# Patient Record
Sex: MALE | Race: WHITE | NOT HISPANIC OR LATINO
[De-identification: names, ages, dates, MRNs, and addresses within clinical notes are randomized per-mention and may not be internally consistent; named-entity substitution may affect disease eponyms.]

---

## 2023-10-09 ENCOUNTER — NON-APPOINTMENT (OUTPATIENT)
Age: 43
End: 2023-10-09

## 2023-10-09 PROBLEM — Z00.00 ENCOUNTER FOR PREVENTIVE HEALTH EXAMINATION: Status: ACTIVE | Noted: 2023-10-09

## 2023-10-10 ENCOUNTER — NON-APPOINTMENT (OUTPATIENT)
Age: 43
End: 2023-10-10

## 2023-10-10 ENCOUNTER — APPOINTMENT (OUTPATIENT)
Dept: SPINE | Facility: CLINIC | Age: 43
End: 2023-10-10
Payer: COMMERCIAL

## 2023-10-10 ENCOUNTER — RESULT REVIEW (OUTPATIENT)
Age: 43
End: 2023-10-10

## 2023-10-10 ENCOUNTER — OUTPATIENT (OUTPATIENT)
Dept: OUTPATIENT SERVICES | Facility: HOSPITAL | Age: 43
LOS: 1 days | End: 2023-10-10
Payer: COMMERCIAL

## 2023-10-10 VITALS
WEIGHT: 190 LBS | HEIGHT: 68 IN | SYSTOLIC BLOOD PRESSURE: 135 MMHG | HEART RATE: 86 BPM | BODY MASS INDEX: 28.79 KG/M2 | OXYGEN SATURATION: 98 % | DIASTOLIC BLOOD PRESSURE: 89 MMHG | RESPIRATION RATE: 18 BRPM

## 2023-10-10 PROCEDURE — 99205 OFFICE O/P NEW HI 60 MIN: CPT

## 2023-10-10 PROCEDURE — 72114 X-RAY EXAM L-S SPINE BENDING: CPT

## 2023-10-10 PROCEDURE — 72114 X-RAY EXAM L-S SPINE BENDING: CPT | Mod: 26

## 2023-10-12 ENCOUNTER — NON-APPOINTMENT (OUTPATIENT)
Age: 43
End: 2023-10-12

## 2023-10-16 RX ORDER — TRAMADOL HYDROCHLORIDE 50 MG/1
50 TABLET, COATED ORAL
Qty: 60 | Refills: 0 | Status: ACTIVE | COMMUNITY
Start: 2023-10-16 | End: 1900-01-01

## 2023-10-19 LAB
HCT VFR BLD CALC: 43 %
HGB BLD-MCNC: 14.2 G/DL
MCHC RBC-ENTMCNC: 30.3 PG
MCHC RBC-ENTMCNC: 33 GM/DL
MCV RBC AUTO: 91.9 FL
PLATELET # BLD AUTO: 254 K/UL
RBC # BLD: 4.68 M/UL
RBC # FLD: 12.9 %
WBC # FLD AUTO: 6.62 K/UL

## 2023-10-20 LAB
ESTIMATED AVERAGE GLUCOSE: 103 MG/DL
HBA1C MFR BLD HPLC: 5.2 %

## 2023-10-22 ENCOUNTER — TRANSCRIPTION ENCOUNTER (OUTPATIENT)
Age: 43
End: 2023-10-22

## 2023-10-23 ENCOUNTER — OUTPATIENT (OUTPATIENT)
Dept: OUTPATIENT SERVICES | Facility: HOSPITAL | Age: 43
LOS: 1 days | Discharge: HOME | End: 2023-10-23
Payer: COMMERCIAL

## 2023-10-23 ENCOUNTER — TRANSCRIPTION ENCOUNTER (OUTPATIENT)
Age: 43
End: 2023-10-23

## 2023-10-23 VITALS
TEMPERATURE: 97 F | RESPIRATION RATE: 16 BRPM | DIASTOLIC BLOOD PRESSURE: 75 MMHG | SYSTOLIC BLOOD PRESSURE: 131 MMHG | OXYGEN SATURATION: 96 % | HEART RATE: 81 BPM

## 2023-10-23 VITALS
OXYGEN SATURATION: 99 % | TEMPERATURE: 96 F | WEIGHT: 187.61 LBS | DIASTOLIC BLOOD PRESSURE: 95 MMHG | HEIGHT: 68 IN | HEART RATE: 79 BPM | RESPIRATION RATE: 16 BRPM | SYSTOLIC BLOOD PRESSURE: 154 MMHG

## 2023-10-23 DIAGNOSIS — Z90.89 ACQUIRED ABSENCE OF OTHER ORGANS: Chronic | ICD-10-CM

## 2023-10-23 DIAGNOSIS — Z90.49 ACQUIRED ABSENCE OF OTHER SPECIFIED PARTS OF DIGESTIVE TRACT: Chronic | ICD-10-CM

## 2023-10-23 DIAGNOSIS — K08.409 PARTIAL LOSS OF TEETH, UNSPECIFIED CAUSE, UNSPECIFIED CLASS: Chronic | ICD-10-CM

## 2023-10-23 LAB
BLD GP AB SCN SERPL QL: NEGATIVE — SIGNIFICANT CHANGE UP
BLD GP AB SCN SERPL QL: NEGATIVE — SIGNIFICANT CHANGE UP
RH IG SCN BLD-IMP: POSITIVE — SIGNIFICANT CHANGE UP
RH IG SCN BLD-IMP: POSITIVE — SIGNIFICANT CHANGE UP

## 2023-10-23 PROCEDURE — 86901 BLOOD TYPING SEROLOGIC RH(D): CPT

## 2023-10-23 PROCEDURE — 76000 FLUOROSCOPY <1 HR PHYS/QHP: CPT

## 2023-10-23 PROCEDURE — 86850 RBC ANTIBODY SCREEN: CPT

## 2023-10-23 PROCEDURE — 62380 NDSC DCMPRN 1 NTRSPC LUMBAR: CPT | Mod: 80

## 2023-10-23 PROCEDURE — 86900 BLOOD TYPING SEROLOGIC ABO: CPT

## 2023-10-23 PROCEDURE — 63030 LAMOT DCMPRN NRV RT 1 LMBR: CPT | Mod: RT

## 2023-10-23 PROCEDURE — 62380 NDSC DCMPRN 1 NTRSPC LUMBAR: CPT | Mod: RT

## 2023-10-23 RX ORDER — OXYCODONE HYDROCHLORIDE 5 MG/1
1 TABLET ORAL
Qty: 0 | Refills: 0 | DISCHARGE
Start: 2023-10-23 | End: 2023-10-26

## 2023-10-23 RX ORDER — OXYCODONE HYDROCHLORIDE 5 MG/1
1 TABLET ORAL
Qty: 30 | Refills: 0
Start: 2023-10-23 | End: 2023-10-27

## 2023-10-23 RX ORDER — ACETAMINOPHEN 500 MG
1000 TABLET ORAL ONCE
Refills: 0 | Status: COMPLETED | OUTPATIENT
Start: 2023-10-23 | End: 2023-10-23

## 2023-10-23 RX ORDER — SODIUM CHLORIDE 9 MG/ML
1000 INJECTION, SOLUTION INTRAVENOUS
Refills: 0 | Status: DISCONTINUED | OUTPATIENT
Start: 2023-10-23 | End: 2023-10-23

## 2023-10-23 RX ORDER — LOSARTAN POTASSIUM 100 MG/1
25 TABLET, FILM COATED ORAL DAILY
Refills: 0 | Status: DISCONTINUED | OUTPATIENT
Start: 2023-10-23 | End: 2023-10-23

## 2023-10-23 RX ORDER — POVIDONE-IODINE 5 %
1 AEROSOL (ML) TOPICAL ONCE
Refills: 0 | Status: COMPLETED | OUTPATIENT
Start: 2023-10-23 | End: 2023-10-23

## 2023-10-23 RX ORDER — FINASTERIDE 5 MG/1
5 TABLET, FILM COATED ORAL DAILY
Refills: 0 | Status: DISCONTINUED | OUTPATIENT
Start: 2023-10-23 | End: 2023-10-23

## 2023-10-23 RX ORDER — APREPITANT 80 MG/1
40 CAPSULE ORAL ONCE
Refills: 0 | Status: COMPLETED | OUTPATIENT
Start: 2023-10-23 | End: 2023-10-23

## 2023-10-23 RX ORDER — ACETAMINOPHEN 500 MG
1000 TABLET ORAL EVERY 8 HOURS
Refills: 0 | Status: DISCONTINUED | OUTPATIENT
Start: 2023-10-23 | End: 2023-10-23

## 2023-10-23 RX ORDER — OXYCODONE HYDROCHLORIDE 5 MG/1
10 TABLET ORAL EVERY 6 HOURS
Refills: 0 | Status: DISCONTINUED | OUTPATIENT
Start: 2023-10-23 | End: 2023-10-23

## 2023-10-23 RX ORDER — CHLORHEXIDINE GLUCONATE 213 G/1000ML
1 SOLUTION TOPICAL EVERY 12 HOURS
Refills: 0 | Status: DISCONTINUED | OUTPATIENT
Start: 2023-10-23 | End: 2023-10-23

## 2023-10-23 RX ORDER — ACETAMINOPHEN 500 MG
2 TABLET ORAL
Qty: 0 | Refills: 0 | DISCHARGE
Start: 2023-10-23 | End: 2023-10-26

## 2023-10-23 RX ADMIN — Medication 1 APPLICATION(S): at 07:26

## 2023-10-23 RX ADMIN — APREPITANT 40 MILLIGRAM(S): 80 CAPSULE ORAL at 07:22

## 2023-10-23 RX ADMIN — CHLORHEXIDINE GLUCONATE 1 APPLICATION(S): 213 SOLUTION TOPICAL at 07:27

## 2023-10-23 NOTE — H&P ADULT - NSHPPOADEEPVENOUSTHROMB_GEN_A_CORE
Ear Cerumen Removal    Date/Time: 1/28/2022 10:00 AM  Performed by: Kerri Vasquez DNP, FNP-C  Authorized by: Kerri Vasquez DNP, FNP-C     Consent Done?:  Yes (Verbal)    Local anesthetic:  None  Location details:  Both ears  Procedure type: curette    Cerumen  Removal Results:  Cerumen completely removed  Patient tolerance:  Patient tolerated the procedure well with no immediate complications     Procedure Note:    The patient was brought to the minor procedure room and placed under the operating microscope of the left ear canal which was cleaned of ceruminous debris. Using a combination of suction, curettes and cup forceps the patient's cerumen impaction was removed. The tympanic membrane was evaluated and was unremarkable. The patient tolerated the procedure well. There were no complications.  Procedure Note:    Patient was brought to the minor procedure room and using the operating microscope of the right ear canal which was cleaned of ceruminous debris. There was a significant cerumen impaction.  Using a combination of suction, curettes and cup forceps the patient's cerumen impaction was removed. Tympanic membrane intact. Pt tolerated well. There were no complications.        
no

## 2023-10-23 NOTE — ASU PATIENT PROFILE, ADULT - NSICDXPASTSURGICALHX_GEN_ALL_CORE_FT
PAST SURGICAL HISTORY:  History of appendectomy     History of tonsillectomy     [place] teeth extracted

## 2023-10-23 NOTE — H&P ADULT - ASSESSMENT
This 38 year old male patient presented with low back ache on and off since 2020, now presented with low backache with right lower limb radiation since June 2023. He has difficulty in walking, but can continue to walk despite the pain. He was diagnosed to have Right L4-5 paracentral Prolapsed intervertebral disc. Today he is planned for endoscopic right L4-L5 transforaminal discectomy.

## 2023-10-23 NOTE — DISCHARGE NOTE PROVIDER - NSDCMRMEDTOKEN_GEN_ALL_CORE_FT
finasteride 5 mg oral tablet: 1 tab(s) orally once a day  irbesartan 75 mg oral tablet: 1 tab(s) orally once a day  oxyCODONE 10 mg oral tablet: 1 tab(s) orally every 6 hours as needed for Severe Pain (7 - 10)  Tylenol 500 mg oral tablet: 2 tab(s) orally every 8 hours do not take more than 1000mg per dose

## 2023-10-23 NOTE — PRE-ANESTHESIA EVALUATION ADULT - NSANTHADDINFOFT_GEN_ALL_CORE
General anesthesia, regional nerve block discussed with patient and family.  All questions answered.  Safety emphasized.

## 2023-10-23 NOTE — ASU DISCHARGE PLAN (ADULT/PEDIATRIC) - COMMENTS
Please follow-up in 2 weeks from date of surgery, call the office to confirm your appointment and time.

## 2023-10-23 NOTE — ASU PATIENT PROFILE, ADULT - FALL HARM RISK - UNIVERSAL INTERVENTIONS
Bed in lowest position, wheels locked, appropriate side rails in place/Call bell, personal items and telephone in reach/Instruct patient to call for assistance before getting out of bed or chair/Non-slip footwear when patient is out of bed/[place] to call system/Physically safe environment - no spills, clutter or unnecessary equipment/Purposeful Proactive Rounding/Room/bathroom lighting operational, light cord in reach

## 2023-10-23 NOTE — H&P ADULT - NSICDXPASTSURGICALHX_GEN_ALL_CORE_FT
PAST SURGICAL HISTORY:  History of appendectomy     History of tonsillectomy     [place] teeth extracted

## 2023-10-23 NOTE — H&P ADULT - NSHPPHYSICALEXAM_GEN_ALL_CORE
T(C): 35.8 (10-23-23 @ 07:38), Max: 35.8 (10-23-23 @ 07:12)  HR: 79 (10-23-23 @ 07:38) (79 - 79)  BP: 154/95 (10-23-23 @ 07:38) (154/95 - 154/95)  RR: 16 (10-23-23 @ 07:38) (16 - 16)  SpO2: 99% (10-23-23 @ 07:38) (99% - 99%)    CONSTITUTIONAL: Well groomed, no apparent distress  EYES: PERRLA and symmetric, EOMI, No conjunctival or scleral injection, non-icteric  ENMT: Oral mucosa with moist membranes. Normal dentition; no pharyngeal injection or exudates             NECK: Supple, symmetric and without tracheal deviation   RESP: No respiratory distress, no use of accessory muscles; CTA b/l, no WRR  CV: RRR, +S1S2, no MRG; no JVD; no peripheral edema  GI: Soft, NT, ND, no rebound, no guarding; no palpable masses; no hepatosplenomegaly; no hernia palpated  LYMPH: No cervical LAD or tenderness; no axillary LAD or tenderness; no inguinal LAD or tenderness  MSK: Normal gait; No digital clubbing or cyanosis; examination of the (head/neck/spine/ribs/pelvis, RUE, LUE, RLE, LLE) without misalignment,            Normal ROM without pain, no spinal tenderness, normal muscle strength/tone  SKIN: No rashes or ulcers noted; no subcutaneous nodules or induration palpable  NEURO: CN II-XII intact; Right EHL 4+/5, rest all 5/5,  sensation intact in upper and lower extremities b/l to light touch   PSYCH: Appropriate insight/judgment; A+O x 3, mood and affect appropriate, recent/remote memory intact

## 2023-10-23 NOTE — ASU DISCHARGE PLAN (ADULT/PEDIATRIC) - NS MD DC FALL RISK RISK
For information on Fall & Injury Prevention, visit: [email or web address] OR  [email or web address] OR  https://www.cdc.gov/steadi/patient.html

## 2023-10-23 NOTE — ASU DISCHARGE PLAN (ADULT/PEDIATRIC) - CARE PROVIDER_API CALL
Errol Kolb [facility]  Neurosurgery  [address] [address]  Phone: [phone]  Fax: [phone]  Follow Up Time:

## 2023-10-23 NOTE — DISCHARGE NOTE PROVIDER - CARE PROVIDER_API CALL
Errol Kolb [facility]  Neurosurgery  [address] [address]  Phone: [phone]  Fax: [phone]  Follow Up Time: 2 weeks

## 2023-10-23 NOTE — H&P ADULT - HISTORY OF PRESENT ILLNESS
This This 38 year old male patient presented with low back ache on and off since 2020, now presented with low backache with right lower limb radiation since June 2023. He has difficulty in walking, but can continue to walk despite the pain.

## 2023-11-01 PROBLEM — I10 ESSENTIAL (PRIMARY) HYPERTENSION: Chronic | Status: ACTIVE | Noted: 2023-10-20

## 2023-11-07 ENCOUNTER — APPOINTMENT (OUTPATIENT)
Dept: SPINE | Facility: CLINIC | Age: 43
End: 2023-11-07
Payer: COMMERCIAL

## 2023-11-07 DIAGNOSIS — Z86.79 PERSONAL HISTORY OF OTHER DISEASES OF THE CIRCULATORY SYSTEM: ICD-10-CM

## 2023-11-07 DIAGNOSIS — M51.36 OTHER INTERVERTEBRAL DISC DEGENERATION, LUMBAR REGION: ICD-10-CM

## 2023-11-07 PROCEDURE — 99024 POSTOP FOLLOW-UP VISIT: CPT

## 2023-11-07 RX ORDER — METHYLPREDNISOLONE 4 MG/1
4 TABLET ORAL
Qty: 1 | Refills: 0 | Status: ACTIVE | COMMUNITY
Start: 2023-11-07 | End: 1900-01-01

## 2023-12-07 ENCOUNTER — APPOINTMENT (OUTPATIENT)
Dept: SPINE | Facility: CLINIC | Age: 43
End: 2023-12-07
Payer: COMMERCIAL

## 2023-12-07 VITALS
TEMPERATURE: 98.3 F | OXYGEN SATURATION: 97 % | DIASTOLIC BLOOD PRESSURE: 63 MMHG | SYSTOLIC BLOOD PRESSURE: 147 MMHG | HEART RATE: 83 BPM | RESPIRATION RATE: 16 BRPM

## 2023-12-07 PROCEDURE — 99024 POSTOP FOLLOW-UP VISIT: CPT

## 2024-01-18 ENCOUNTER — APPOINTMENT (OUTPATIENT)
Dept: SPINE | Facility: CLINIC | Age: 44
End: 2024-01-18
Payer: COMMERCIAL

## 2024-01-18 VITALS
RESPIRATION RATE: 18 BRPM | WEIGHT: 187 LBS | OXYGEN SATURATION: 98 % | SYSTOLIC BLOOD PRESSURE: 141 MMHG | DIASTOLIC BLOOD PRESSURE: 89 MMHG | HEART RATE: 84 BPM | HEIGHT: 68 IN | BODY MASS INDEX: 28.34 KG/M2 | TEMPERATURE: 97.7 F

## 2024-01-18 PROCEDURE — 99024 POSTOP FOLLOW-UP VISIT: CPT

## 2024-01-19 NOTE — HISTORY OF PRESENT ILLNESS
[FreeTextEntry1] : 44 yo M with severe back pain to RLE for a year without injury. Images showed L4-5 large disc herniation. He underwent elective L4-5 microdiskectomy on 10/23/23. Post op hospital stay was uneventful and he was discharged to home.  11/7/23 he reports pain has been slowly improving and noticed intermittent leg shooting pain from changing position otherwise denies fever/chills, cp, sob, dizziness, n/v, BB dysfunction, redness/swelling/discharge from surgical incision. Pain has been controlled with NSAIDs.  12/7/23 Patient complains of pain when walking, RLE pain and occasional numbness and tingling in the right foot. Patient states he is taking 2 aleve daily for pain.  1/18/24 Today patient complains of continued pain. MRI L reviewed.

## 2024-01-19 NOTE — RESULTS/DATA
[FreeTextEntry1] : EXAM:  MRI LUMBAR SPINE WITHOUT CONTRAST 1/10/24 HISTORY: Right leg pain since June 2023.  History of a microdiscectomy. TECHNIQUE: Multiplanar, multi-sequential MRI of the lumbar spine was obtained on a 1.5T scanner using a standard protocol. COMPARISON:  MRI 10/5/2023. FINDINGS: The vertebral bodies are of normal height, alignment, and bone marrow signal intensity. The spinal cord termination is normal. The conus medullaris lies in a normal location posterior to the L1 vertebral body. Evaluation of the individual disc levels demonstrates: L1-L2: There is no disc herniation, spinal canal, or neuroforaminal stenosis. The facet joints are normal. L2-L3: There is no disc herniation, spinal canal, or neuroforaminal stenosis. The facet joints are normal. L3-L4: There is no disc herniation, spinal canal, or neuroforaminal stenosis. The facet joints are normal. L4-L5: Moderate intervertebral disc degeneration with disc bulge. There is a superimposed right subarticular disc protrusion. The size of this disc protrusion is not significantly changed compared to the prior exam. There is severe right lateral recess stenosis with impingement of the descending right L5 nerve root. Mild central spinal canal stenosis. Moderate right neuroforaminal stenosis, slightly progressed. Mild left neuroforaminal stenosis. L5-S1: Moderate intervertebral disc degeneration with disc bulge. Mild bilateral facet arthropathy. Disc bulge is slightly asymmetric to the left. There is abutment of the descending left S1 nerve root. No central spinal canal stenosis. No neuroforaminal stenosis. The visualized portions of the retroperitoneum and paraspinal muscles are within normal limits. IMPRESSION:  MRI of the lumbar spine demonstrates: 1.  Disc bulge with right subarticular disc protrusion at L4-5, results in severe right lateral recess stenosis and impingement of the descending right L5 nerve root. Moderate right neuroforaminal stenosis at this level slightly progressed compared to the prior exam. 2.  Disc bulge asymmetric to the left L5-S1 results in abutment of the descending left S1 nerve root, not significantly changed.

## 2024-01-30 RX ORDER — GABAPENTIN 300 MG/1
300 CAPSULE ORAL 3 TIMES DAILY
Qty: 90 | Refills: 3 | Status: ACTIVE | COMMUNITY
Start: 2024-01-29 | End: 1900-01-01

## 2024-02-27 ENCOUNTER — APPOINTMENT (OUTPATIENT)
Dept: SPINE | Facility: CLINIC | Age: 44
End: 2024-02-27
Payer: COMMERCIAL

## 2024-02-27 PROCEDURE — 99214 OFFICE O/P EST MOD 30 MIN: CPT

## 2024-02-27 NOTE — HISTORY OF PRESENT ILLNESS
[FreeTextEntry1] : 44 yo M with severe back pain to RLE for a year without injury. Images showed L4-5 large disc herniation. He underwent elective L4-5 microdiskectomy on 10/23/23. Post op hospital stay was uneventful and he was discharged to home.  11/7/23 he reports pain has been slowly improving and noticed intermittent leg shooting pain from changing position otherwise denies fever/chills, cp, sob, dizziness, n/v, BB dysfunction, redness/swelling/discharge from surgical incision. Pain has been controlled with NSAIDs.  12/7/23 Patient complains of pain when walking, RLE pain and occasional numbness and tingling in the right foot. Patient states he is taking 2 aleve daily for pain.  1/18/24 Patient complains of continued pain. MRI L reviewed.   Today 02/28/24

## 2024-02-28 ENCOUNTER — APPOINTMENT (OUTPATIENT)
Dept: SPINE | Facility: CLINIC | Age: 44
End: 2024-02-28
Payer: COMMERCIAL

## 2024-02-28 DIAGNOSIS — M51.26 OTHER INTERVERTEBRAL DISC DISPLACEMENT, LUMBAR REGION: ICD-10-CM

## 2024-02-28 PROCEDURE — 99443: CPT

## 2024-02-28 NOTE — REASON FOR VISIT
[New Patient Visit] : a new patient visit [Home] : at home, [unfilled] , at the time of the visit. [Medical Office: ([facility])___] : at the medical office located in  [Verbal consent obtained from patient] : the patient, [unfilled]

## 2024-02-29 NOTE — ASSESSMENT
[FreeTextEntry1] : Plan:  L4-L5 microdisckectomy. Risks and benefits discussed with patient verbalizing understanding. Pre-op clearance emailed to the patient.

## 2024-02-29 NOTE — HISTORY OF PRESENT ILLNESS
[FreeTextEntry1] : 42 yo M with severe back pain to RLE for a year without injury. Images showed L4-5 large disc herniation. He underwent elective L4-5 microdiskectomy on 10/23/23. Post op hospital stay was uneventful and he was discharged to home.  11/7/23 he reports pain has been slowly improving and noticed intermittent leg shooting pain from changing position otherwise denies fever/chills, cp, sob, dizziness, n/v, BB dysfunction, redness/swelling/discharge from surgical incision. Pain has been controlled with NSAIDs.  12/7/23 Patient complains of pain when walking, RLE pain and occasional numbness and tingling in the right foot. Patient states he is taking 2 aleve daily for pain.  1/18/24 Patient complains of continued pain. MRI L reviewed.   Today 02/28/24 patient reporting RLE pain with numbness and tingling to the right foot. Patient has an appointment to discuss surgery.

## 2024-02-29 NOTE — ADDENDUM
[FreeTextEntry1] : The patient is a 43-year-old gentleman with a history of a prior L4-5 disc herniation that was treated by a microdiscectomy in October 2023.  The patient reported some relief following the surgery but continues to have significant right lower extremity pain that is radiating into the L5 distribution.  He has been trying physical therapy and oral medications as well as lifestyle modification with no significant relief in his symptoms.  A new MRI scan of his lumbar spine shows a fragment of disc material within the lateral recess at L4-5 causing significant compression of the traversing L5 nerve root.  Given the patient's symptoms and imaging findings, I feel that he would benefit from procedure to decompress the L5 nerve root on the right side via a microdiscectomy.  I discussed with him the risks, benefits, alternatives and expected outcomes of performing an L4-5 hemilaminotomy, medial facetectomy, foraminotomies and microdiscectomy.  The patient understood, all of his questions were answered to his satisfaction and he would like to proceed with surgical planning.  He was given instructions regarding medical pretesting as well as scheduling for the operation.

## 2024-03-07 ENCOUNTER — APPOINTMENT (OUTPATIENT)
Dept: INTERNAL MEDICINE | Facility: CLINIC | Age: 44
End: 2024-03-07
Payer: COMMERCIAL

## 2024-03-07 ENCOUNTER — NON-APPOINTMENT (OUTPATIENT)
Age: 44
End: 2024-03-07

## 2024-03-07 VITALS
WEIGHT: 190 LBS | DIASTOLIC BLOOD PRESSURE: 78 MMHG | RESPIRATION RATE: 16 BRPM | SYSTOLIC BLOOD PRESSURE: 118 MMHG | TEMPERATURE: 97.7 F | BODY MASS INDEX: 28.79 KG/M2 | HEIGHT: 68 IN | HEART RATE: 86 BPM | OXYGEN SATURATION: 98 %

## 2024-03-07 DIAGNOSIS — Z01.818 ENCOUNTER FOR OTHER PREPROCEDURAL EXAMINATION: ICD-10-CM

## 2024-03-07 PROCEDURE — 99214 OFFICE O/P EST MOD 30 MIN: CPT | Mod: 25

## 2024-03-07 PROCEDURE — 93000 ELECTROCARDIOGRAM COMPLETE: CPT

## 2024-03-07 PROCEDURE — 36415 COLL VENOUS BLD VENIPUNCTURE: CPT

## 2024-03-07 NOTE — PHYSICAL EXAM
[No Acute Distress] : no acute distress [Well Nourished] : well nourished [Well Developed] : well developed [Well-Appearing] : well-appearing [Normal Sclera/Conjunctiva] : normal sclera/conjunctiva [PERRL] : pupils equal round and reactive to light [EOMI] : extraocular movements intact [Normal Outer Ear/Nose] : the outer ears and nose were normal in appearance [Normal Oropharynx] : the oropharynx was normal [No Lymphadenopathy] : no lymphadenopathy [No JVD] : no jugular venous distention [Supple] : supple [Thyroid Normal, No Nodules] : the thyroid was normal and there were no nodules present [No Respiratory Distress] : no respiratory distress  [No Accessory Muscle Use] : no accessory muscle use [Clear to Auscultation] : lungs were clear to auscultation bilaterally [Normal Rate] : normal rate  [Regular Rhythm] : with a regular rhythm [Normal S1, S2] : normal S1 and S2 [No Murmur] : no murmur heard [No Carotid Bruits] : no carotid bruits [No Varicosities] : no varicosities [No Abdominal Bruit] : a ~M bruit was not heard ~T in the abdomen [Pedal Pulses Present] : the pedal pulses are present [No Edema] : there was no peripheral edema [No Palpable Aorta] : no palpable aorta [No Extremity Clubbing/Cyanosis] : no extremity clubbing/cyanosis [Soft] : abdomen soft [Non-distended] : non-distended [Non Tender] : non-tender [No Masses] : no abdominal mass palpated [No HSM] : no HSM [Normal Bowel Sounds] : normal bowel sounds [Normal Posterior Cervical Nodes] : no posterior cervical lymphadenopathy [Normal Anterior Cervical Nodes] : no anterior cervical lymphadenopathy [No CVA Tenderness] : no CVA  tenderness [No Spinal Tenderness] : no spinal tenderness [No Joint Swelling] : no joint swelling [No Rash] : no rash [Grossly Normal Strength/Tone] : grossly normal strength/tone [No Focal Deficits] : no focal deficits [Coordination Grossly Intact] : coordination grossly intact [Normal Gait] : normal gait [Normal Affect] : the affect was normal [Deep Tendon Reflexes (DTR)] : deep tendon reflexes were 2+ and symmetric [Normal Insight/Judgement] : insight and judgment were intact

## 2024-03-08 LAB
ALBUMIN SERPL ELPH-MCNC: 4.6 G/DL
ALP BLD-CCNC: 56 U/L
ALT SERPL-CCNC: 23 U/L
ANION GAP SERPL CALC-SCNC: 14 MMOL/L
APTT BLD: 29.1 SEC
AST SERPL-CCNC: 17 U/L
BASOPHILS # BLD AUTO: 0.04 K/UL
BASOPHILS NFR BLD AUTO: 0.6 %
BILIRUB SERPL-MCNC: 0.6 MG/DL
BUN SERPL-MCNC: 20 MG/DL
CALCIUM SERPL-MCNC: 9.4 MG/DL
CHLORIDE SERPL-SCNC: 100 MMOL/L
CO2 SERPL-SCNC: 26 MMOL/L
CREAT SERPL-MCNC: 0.82 MG/DL
EGFR: 111 ML/MIN/1.73M2
EOSINOPHIL # BLD AUTO: 0.08 K/UL
EOSINOPHIL NFR BLD AUTO: 1.1 %
ESTIMATED AVERAGE GLUCOSE: 111 MG/DL
GLUCOSE SERPL-MCNC: 103 MG/DL
HBA1C MFR BLD HPLC: 5.5 %
HCT VFR BLD CALC: 40.5 %
HGB BLD-MCNC: 14 G/DL
IMM GRANULOCYTES NFR BLD AUTO: 0.3 %
INR PPP: 0.98 RATIO
LYMPHOCYTES # BLD AUTO: 1.67 K/UL
LYMPHOCYTES NFR BLD AUTO: 23.5 %
MAN DIFF?: NORMAL
MCHC RBC-ENTMCNC: 29.9 PG
MCHC RBC-ENTMCNC: 34.6 GM/DL
MCV RBC AUTO: 86.5 FL
MONOCYTES # BLD AUTO: 0.57 K/UL
MONOCYTES NFR BLD AUTO: 8 %
NEUTROPHILS # BLD AUTO: 4.73 K/UL
NEUTROPHILS NFR BLD AUTO: 66.5 %
PLATELET # BLD AUTO: 251 K/UL
POTASSIUM SERPL-SCNC: 4.4 MMOL/L
PROT SERPL-MCNC: 6.9 G/DL
PT BLD: 11.1 SEC
RBC # BLD: 4.68 M/UL
RBC # FLD: 12.9 %
SODIUM SERPL-SCNC: 140 MMOL/L
WBC # FLD AUTO: 7.11 K/UL

## 2024-03-08 NOTE — ASSESSMENT
[Patient Optimized for Surgery] : Patient optimized for surgery [No Further Testing Recommended] : no further testing recommended [Continue medications as is] : Continue current medications [As per surgery] : as per surgery [FreeTextEntry4] : Patient is low risk for low risk procedure.  He was cleared for a similar procedure in October 2023 and no major health updates have occurred since then.  BP well controlled on irbesartan and he takes finasteride 1mg for alopecia.  RCRI class 1, Evans score 0% EKG was NSR.  Bloodwork was within normal limits.

## 2024-03-08 NOTE — HISTORY OF PRESENT ILLNESS
[No Pertinent Cardiac History] : no history of aortic stenosis, atrial fibrillation, coronary artery disease, recent myocardial infarction, or implantable device/pacemaker [No Pertinent Pulmonary History] : no history of asthma, COPD, sleep apnea, or smoking [No Adverse Anesthesia Reaction] : no adverse anesthesia reaction in self or family member [(Patient denies any chest pain, claudication, dyspnea on exertion, orthopnea, palpitations or syncope)] : Patient denies any chest pain, claudication, dyspnea on exertion, orthopnea, palpitations or syncope [Excellent (>10 METs)] : Excellent (>10 METs) [Chronic Anticoagulation] : no chronic anticoagulation [Chronic Kidney Disease] : no chronic kidney disease [FreeTextEntry1] : microdiscectomy [FreeTextEntry3] : Dr. Christiano Fox [Diabetes] : no diabetes [FreeTextEntry4] : 43M with PMHx alopecia, HTN and chronic low back pain who presents for preoperative clearance for microdiscectomy with Dr. Fox. He had a lumbar spinal surgery in October 2023, however he continues to have severe low back pain and Dr. Fox determined patient would be candidate for the repeat spinal surgery.  Current medications: irbesartan 75mg, finasteride 1mg.

## 2024-03-15 VITALS
WEIGHT: 185.19 LBS | SYSTOLIC BLOOD PRESSURE: 133 MMHG | DIASTOLIC BLOOD PRESSURE: 88 MMHG | HEIGHT: 68 IN | HEART RATE: 79 BPM | OXYGEN SATURATION: 99 % | TEMPERATURE: 98 F | RESPIRATION RATE: 16 BRPM

## 2024-03-15 NOTE — ASU PATIENT PROFILE, ADULT - NSICDXPASTMEDICALHX_GEN_ALL_CORE_FT
PAST MEDICAL HISTORY:  HTN (hypertension)      PAST MEDICAL HISTORY:  HTN (hypertension)     Lumbosacral radiculopathy

## 2024-03-15 NOTE — ASU PATIENT PROFILE, ADULT - NSICDXPASTSURGICALHX_GEN_ALL_CORE_FT
PAST SURGICAL HISTORY:  History of appendectomy     History of tonsillectomy     [place] teeth extracted      PAST SURGICAL HISTORY:  History of appendectomy     History of microdiscectomy lumbar,  2023    History of tonsillectomy     [place] teeth extracted

## 2024-03-17 ENCOUNTER — TRANSCRIPTION ENCOUNTER (OUTPATIENT)
Age: 44
End: 2024-03-17

## 2024-03-17 RX ORDER — POVIDONE-IODINE 5 %
1 AEROSOL (ML) TOPICAL ONCE
Refills: 0 | Status: COMPLETED | OUTPATIENT
Start: 2024-03-18 | End: 2024-03-18

## 2024-03-18 ENCOUNTER — OUTPATIENT (OUTPATIENT)
Dept: OUTPATIENT SERVICES | Facility: HOSPITAL | Age: 44
LOS: 1 days | Discharge: HOME | End: 2024-03-18
Payer: COMMERCIAL

## 2024-03-18 ENCOUNTER — TRANSCRIPTION ENCOUNTER (OUTPATIENT)
Age: 44
End: 2024-03-18

## 2024-03-18 ENCOUNTER — APPOINTMENT (OUTPATIENT)
Dept: SPINE | Facility: HOSPITAL | Age: 44
End: 2024-03-18

## 2024-03-18 VITALS
RESPIRATION RATE: 18 BRPM | HEART RATE: 87 BPM | SYSTOLIC BLOOD PRESSURE: 117 MMHG | DIASTOLIC BLOOD PRESSURE: 63 MMHG | OXYGEN SATURATION: 95 %

## 2024-03-18 DIAGNOSIS — Z90.89 ACQUIRED ABSENCE OF OTHER ORGANS: Chronic | ICD-10-CM

## 2024-03-18 DIAGNOSIS — Z98.890 OTHER SPECIFIED POSTPROCEDURAL STATES: Chronic | ICD-10-CM

## 2024-03-18 DIAGNOSIS — Z90.49 ACQUIRED ABSENCE OF OTHER SPECIFIED PARTS OF DIGESTIVE TRACT: Chronic | ICD-10-CM

## 2024-03-18 DIAGNOSIS — K08.409 PARTIAL LOSS OF TEETH, UNSPECIFIED CAUSE, UNSPECIFIED CLASS: Chronic | ICD-10-CM

## 2024-03-18 PROBLEM — M54.17 RADICULOPATHY, LUMBOSACRAL REGION: Chronic | Status: ACTIVE | Noted: 2024-03-15

## 2024-03-18 LAB
BLD GP AB SCN SERPL QL: NEGATIVE — SIGNIFICANT CHANGE UP
RH IG SCN BLD-IMP: POSITIVE — SIGNIFICANT CHANGE UP

## 2024-03-18 PROCEDURE — C1889: CPT

## 2024-03-18 PROCEDURE — 63030 LAMOT DCMPRN NRV RT 1 LMBR: CPT | Mod: RT

## 2024-03-18 PROCEDURE — 86901 BLOOD TYPING SEROLOGIC RH(D): CPT

## 2024-03-18 PROCEDURE — 86900 BLOOD TYPING SEROLOGIC ABO: CPT

## 2024-03-18 PROCEDURE — 76380 CAT SCAN FOLLOW-UP STUDY: CPT

## 2024-03-18 PROCEDURE — 86850 RBC ANTIBODY SCREEN: CPT

## 2024-03-18 DEVICE — SURGIFOAM PAD 8CM X 12.5CM X 10MM (100): Type: IMPLANTABLE DEVICE | Status: FUNCTIONAL

## 2024-03-18 DEVICE — FLOSEAL NT 5ML: Type: IMPLANTABLE DEVICE | Status: FUNCTIONAL

## 2024-03-18 RX ORDER — METHOCARBAMOL 500 MG/1
1 TABLET, FILM COATED ORAL
Qty: 30 | Refills: 0
Start: 2024-03-18

## 2024-03-18 RX ORDER — SENNA PLUS 8.6 MG/1
2 TABLET ORAL
Qty: 0 | Refills: 0 | DISCHARGE
Start: 2024-03-18

## 2024-03-18 RX ORDER — IBUPROFEN 200 MG
1 TABLET ORAL
Qty: 45 | Refills: 0
Start: 2024-03-18

## 2024-03-18 RX ORDER — GABAPENTIN 400 MG/1
1 CAPSULE ORAL
Refills: 0 | DISCHARGE

## 2024-03-18 RX ORDER — APREPITANT 80 MG/1
40 CAPSULE ORAL ONCE
Refills: 0 | Status: COMPLETED | OUTPATIENT
Start: 2024-03-18 | End: 2024-03-18

## 2024-03-18 RX ORDER — SENNA PLUS 8.6 MG/1
2 TABLET ORAL AT BEDTIME
Refills: 0 | Status: DISCONTINUED | OUTPATIENT
Start: 2024-03-18 | End: 2024-03-18

## 2024-03-18 RX ORDER — NALOXONE HYDROCHLORIDE 4 MG/.1ML
4 SPRAY NASAL
Qty: 1 | Refills: 0
Start: 2024-03-18

## 2024-03-18 RX ORDER — ACETAMINOPHEN 500 MG
1000 TABLET ORAL ONCE
Refills: 0 | Status: COMPLETED | OUTPATIENT
Start: 2024-03-18 | End: 2024-03-18

## 2024-03-18 RX ORDER — ACETAMINOPHEN 500 MG
1000 TABLET ORAL EVERY 8 HOURS
Refills: 0 | Status: DISCONTINUED | OUTPATIENT
Start: 2024-03-18 | End: 2024-03-18

## 2024-03-18 RX ORDER — GABAPENTIN 400 MG/1
300 CAPSULE ORAL THREE TIMES A DAY
Refills: 0 | Status: DISCONTINUED | OUTPATIENT
Start: 2024-03-18 | End: 2024-03-18

## 2024-03-18 RX ORDER — HYDROMORPHONE HYDROCHLORIDE 2 MG/ML
0.25 INJECTION INTRAMUSCULAR; INTRAVENOUS; SUBCUTANEOUS ONCE
Refills: 0 | Status: DISCONTINUED | OUTPATIENT
Start: 2024-03-18 | End: 2024-03-18

## 2024-03-18 RX ORDER — CHLORHEXIDINE GLUCONATE 213 G/1000ML
1 SOLUTION TOPICAL EVERY 12 HOURS
Refills: 0 | Status: DISCONTINUED | OUTPATIENT
Start: 2024-03-18 | End: 2024-03-18

## 2024-03-18 RX ORDER — SODIUM CHLORIDE 9 MG/ML
1000 INJECTION, SOLUTION INTRAVENOUS
Refills: 0 | Status: DISCONTINUED | OUTPATIENT
Start: 2024-03-18 | End: 2024-03-18

## 2024-03-18 RX ORDER — METHOCARBAMOL 500 MG/1
500 TABLET, FILM COATED ORAL EVERY 8 HOURS
Refills: 0 | Status: DISCONTINUED | OUTPATIENT
Start: 2024-03-18 | End: 2024-03-18

## 2024-03-18 RX ORDER — OXYCODONE HYDROCHLORIDE 5 MG/1
1 TABLET ORAL
Qty: 30 | Refills: 0
Start: 2024-03-18

## 2024-03-18 RX ORDER — ACETAMINOPHEN 500 MG
2 TABLET ORAL
Qty: 0 | Refills: 0 | DISCHARGE

## 2024-03-18 RX ORDER — IBUPROFEN 200 MG
600 TABLET ORAL EVERY 8 HOURS
Refills: 0 | Status: DISCONTINUED | OUTPATIENT
Start: 2024-03-18 | End: 2024-03-18

## 2024-03-18 RX ORDER — FINASTERIDE 5 MG/1
1 TABLET, FILM COATED ORAL
Refills: 0 | DISCHARGE

## 2024-03-18 RX ORDER — ONDANSETRON 8 MG/1
4 TABLET, FILM COATED ORAL ONCE
Refills: 0 | Status: DISCONTINUED | OUTPATIENT
Start: 2024-03-18 | End: 2024-03-18

## 2024-03-18 RX ORDER — OXYCODONE HYDROCHLORIDE 5 MG/1
5 TABLET ORAL EVERY 4 HOURS
Refills: 0 | Status: DISCONTINUED | OUTPATIENT
Start: 2024-03-18 | End: 2024-03-18

## 2024-03-18 RX ORDER — IRBESARTAN 75 MG/1
1 TABLET ORAL
Refills: 0 | DISCHARGE

## 2024-03-18 RX ADMIN — CHLORHEXIDINE GLUCONATE 1 APPLICATION(S): 213 SOLUTION TOPICAL at 06:46

## 2024-03-18 RX ADMIN — Medication 1000 MILLIGRAM(S): at 06:46

## 2024-03-18 RX ADMIN — Medication 1 APPLICATION(S): at 06:46

## 2024-03-18 RX ADMIN — METHOCARBAMOL 500 MILLIGRAM(S): 500 TABLET, FILM COATED ORAL at 10:57

## 2024-03-18 RX ADMIN — GABAPENTIN 300 MILLIGRAM(S): 400 CAPSULE ORAL at 12:46

## 2024-03-18 RX ADMIN — APREPITANT 40 MILLIGRAM(S): 80 CAPSULE ORAL at 06:33

## 2024-03-18 RX ADMIN — Medication 600 MILLIGRAM(S): at 12:46

## 2024-03-18 RX ADMIN — OXYCODONE HYDROCHLORIDE 5 MILLIGRAM(S): 5 TABLET ORAL at 11:12

## 2024-03-18 RX ADMIN — SODIUM CHLORIDE 85 MILLILITER(S): 9 INJECTION, SOLUTION INTRAVENOUS at 11:00

## 2024-03-18 RX ADMIN — OXYCODONE HYDROCHLORIDE 5 MILLIGRAM(S): 5 TABLET ORAL at 10:57

## 2024-03-18 RX ADMIN — Medication 1000 MILLIGRAM(S): at 06:33

## 2024-03-18 NOTE — BRIEF OPERATIVE NOTE - OPERATION/FINDINGS
s/p L4-L5 minimally invasive lumbar microdiscectomy Right. s/p L4-L5 minimally invasive lumbar hemilaminectomy and  microdiscectomy Right.

## 2024-03-18 NOTE — BRIEF OPERATIVE NOTE - NSICDXBRIEFPREOP_GEN_ALL_CORE_FT
PRE-OP DIAGNOSIS:  Recurrent herniation of lumbar disc 18-Mar-2024 06:36:52  Renuka Hennessy  Lumbar disc herniation with radiculopathy 18-Mar-2024 06:37:01  Renuka Hennessy

## 2024-03-18 NOTE — ASU DISCHARGE PLAN (ADULT/PEDIATRIC) - CARE PROVIDER_API CALL
Christiano Fox.  Neurosurgery  [address] [place] [address]  Phone: [phone]  Fax: [phone]  Follow Up Time: 2 weeks   Christiano Fox.  Neurosurgery  [address] [facility] [place] [address]  Phone: [phone]  Fax: [phone]  Established Patient  Scheduled Appointment: 04/03/2024 09:15 AM

## 2024-03-18 NOTE — BRIEF OPERATIVE NOTE - NSICDXBRIEFPOSTOP_GEN_ALL_CORE_FT
POST-OP DIAGNOSIS:  Lumbar disc herniation with radiculopathy 18-Mar-2024 06:37:17  Renuka Hennessy

## 2024-03-18 NOTE — ASU DISCHARGE PLAN (ADULT/PEDIATRIC) - PROVIDER TOKENS
PROVIDER:[TOKEN:[[id number]],FOLLOWUP:[2 weeks]] PROVIDER:[TOKEN:[[id number]],SCHEDULEDAPPT:[04/03/2024],SCHEDULEDAPPTTIME:[09:15 AM],ESTABLISHEDPATIENT:[T]]

## 2024-03-18 NOTE — BRIEF OPERATIVE NOTE - ANTIBIOTIC PROTOCOL
Followed protocol I personally performed the service described in the documentation recorded by the scribe in my presence, and it accurately and completely records my words and actions.

## 2024-03-18 NOTE — ASU DISCHARGE PLAN (ADULT/PEDIATRIC) - NS MD DC FALL RISK RISK
For information on Fall & Injury Prevention, visit: [email or web address] OR  [email or web address] OR  https://www.cdc.gov/steadi/patient.html

## 2024-03-20 ENCOUNTER — TRANSCRIPTION ENCOUNTER (OUTPATIENT)
Age: 44
End: 2024-03-20

## 2024-03-27 ENCOUNTER — APPOINTMENT (OUTPATIENT)
Dept: SPINE | Facility: CLINIC | Age: 44
End: 2024-03-27
Payer: COMMERCIAL

## 2024-03-27 VITALS
DIASTOLIC BLOOD PRESSURE: 72 MMHG | OXYGEN SATURATION: 98 % | HEART RATE: 94 BPM | TEMPERATURE: 97 F | SYSTOLIC BLOOD PRESSURE: 143 MMHG | WEIGHT: 190 LBS | HEIGHT: 68 IN | BODY MASS INDEX: 28.79 KG/M2 | RESPIRATION RATE: 18 BRPM

## 2024-03-27 PROCEDURE — 99024 POSTOP FOLLOW-UP VISIT: CPT

## 2024-03-27 NOTE — PHYSICAL EXAM
[Person] : oriented to person [Place] : oriented to place [Time] : oriented to time [General Appearance - Alert] : alert [Longitudinal] : longitudinal [Clean] : clean [Dry] : dry [Healing Well] : healing well

## 2024-03-29 NOTE — HISTORY OF PRESENT ILLNESS
[FreeTextEntry1] : 42 yo M with severe back pain to RLE for a year without injury. Images showed L4-5 large disc herniation. He underwent elective L4-5 microdiskectomy on 10/23/23. Post op hospital stay was uneventful and he was discharged to home.  11/7/23 he reports pain has been slowly improving and noticed intermittent leg shooting pain from changing position otherwise denies fever/chills, cp, sob, dizziness, n/v, BB dysfunction, redness/swelling/discharge from surgical incision. Pain has been controlled with NSAIDs.  12/7/23 Patient complains of pain when walking, RLE pain and occasional numbness and tingling in the right foot. Patient states he is taking 2 aleve daily for pain.  1/18/24 Patient complains of continued pain. MRI L reviewed.   Office Visit 02/28/24 patient reporting RLE pain with numbness and tingling to the right foot. Patient has an appointment to discuss surgery.   Surgery 03/18/2024 L4-L5 hemilaminotomy and microdiscectomy  Today 03/27/2024 patient reporting significant improvement in RLE pain. He states a few days ago he had an episode of right lower extremity pain. Incision appears to be healing well with no signs of infection such as swelling, drainage, redness. He denies any SOB, chest pain, unilateral leg swelling.

## 2024-03-29 NOTE — ADDENDUM
[FreeTextEntry1] : Patient doing well s/p L4/5 microdiscectomy.  Had a temporary bout of leg pain on POD 7 that has improved.  Denies back pain.  Wound healing well.  Maintain spinal precautions for 4 weeks and follow up.  Call with questions or concerns.

## 2024-04-22 NOTE — PHYSICAL EXAM
[General Appearance - Alert] : alert [Longitudinal] : longitudinal [Clean] : clean [Dry] : dry [Healing Well] : healing well [Person] : oriented to person [Place] : oriented to place [Time] : oriented to time

## 2024-04-24 ENCOUNTER — APPOINTMENT (OUTPATIENT)
Dept: SPINE | Facility: CLINIC | Age: 44
End: 2024-04-24
Payer: COMMERCIAL

## 2024-04-24 VITALS
BODY MASS INDEX: 28.04 KG/M2 | RESPIRATION RATE: 18 BRPM | HEIGHT: 68 IN | OXYGEN SATURATION: 97 % | SYSTOLIC BLOOD PRESSURE: 127 MMHG | WEIGHT: 185 LBS | DIASTOLIC BLOOD PRESSURE: 81 MMHG | HEART RATE: 98 BPM

## 2024-04-24 PROCEDURE — 99024 POSTOP FOLLOW-UP VISIT: CPT

## 2024-04-25 NOTE — ADDENDUM
[FreeTextEntry1] : The patient is a 44-year-old gentleman who is 6 weeks status post lumbar microdiscectomy.  He is doing very well.  He denies any low back pain or lower extremity radicular symptoms.  He states he does continue to have some soreness and tenderness at his incision site.  His incision appears well-healed.  He is full strength throughout his lower extremities.  His sensation is grossly intact.  At this point, I recommended the patient begin a course of physical therapy for range of motion and strengthening in his low back and lower extremities.  He will return to the office in 6 weeks for routine follow-up.  He knows to call the office if he should have any new questions, concerns or complaints.

## 2024-04-25 NOTE — HISTORY OF PRESENT ILLNESS
[FreeTextEntry1] : 44 yo M with severe back pain to RLE for a year without injury. Images showed L4-5 large disc herniation. He underwent elective L4-5 microdiskectomy on 10/23/23. Post op hospital stay was uneventful and he was discharged to home.  11/7/23 he reports pain has been slowly improving and noticed intermittent leg shooting pain from changing position otherwise denies fever/chills, cp, sob, dizziness, n/v, BB dysfunction, redness/swelling/discharge from surgical incision. Pain has been controlled with NSAIDs.  12/7/23 Patient complains of pain when walking, RLE pain and occasional numbness and tingling in the right foot. Patient states he is taking 2 aleve daily for pain.  1/18/24 Patient complains of continued pain. MRI L reviewed.   Office Visit 02/28/24 patient reporting RLE pain with numbness and tingling to the right foot. Patient has an appointment to discuss surgery.   Surgery 03/18/2024 L4-L5 hemilaminotomy and microdiscectomy  Office Visit 03/27/2024 patient reporting significant improvement in RLE pain. He states a few days ago he had an episode of right lower extremity pain. Incision appears to be healing well with no signs of infection such as swelling, drainage, redness. He denies any SOB, chest pain, unilateral leg swelling.   Today 04/24/2024 patient reporting significant improvement in RLE pain. Incision healing well with no swelling, drainage, or redness noted. Patient is cleared to start PT.

## 2024-05-29 ENCOUNTER — NON-APPOINTMENT (OUTPATIENT)
Age: 44
End: 2024-05-29

## 2024-06-06 ENCOUNTER — APPOINTMENT (OUTPATIENT)
Dept: SPINE | Facility: CLINIC | Age: 44
End: 2024-06-06
Payer: COMMERCIAL

## 2024-06-06 VITALS
RESPIRATION RATE: 18 BRPM | BODY MASS INDEX: 26.83 KG/M2 | TEMPERATURE: 97.7 F | SYSTOLIC BLOOD PRESSURE: 112 MMHG | OXYGEN SATURATION: 98 % | HEART RATE: 66 BPM | HEIGHT: 68 IN | WEIGHT: 177 LBS | DIASTOLIC BLOOD PRESSURE: 73 MMHG

## 2024-06-06 DIAGNOSIS — Z98.890 OTHER SPECIFIED POSTPROCEDURAL STATES: ICD-10-CM

## 2024-06-06 PROCEDURE — 99024 POSTOP FOLLOW-UP VISIT: CPT

## 2024-06-06 NOTE — ADDENDUM
[FreeTextEntry1] : The patient is a 44-year-old gentleman who is 3-month status post microdiscectomy.  He is doing very well.  He denies any low back pain or lower extremity radicular pain.  He is continuing to call daily activities.  At this point, the patient is cleared to resume all activity.  He will return to the office in 3 months for routine follow-up.  He knows to call the office if he should have any new questions, concerns or complaints.

## 2024-06-06 NOTE — HISTORY OF PRESENT ILLNESS
[FreeTextEntry1] : 42 yo M with severe back pain to RLE for a year without injury. Images showed L4-5 large disc herniation. He underwent elective L4-5 microdiskectomy on 10/23/23. Post op hospital stay was uneventful and he was discharged to home.  11/7/23 he reports pain has been slowly improving and noticed intermittent leg shooting pain from changing position otherwise denies fever/chills, cp, sob, dizziness, n/v, BB dysfunction, redness/swelling/discharge from surgical incision. Pain has been controlled with NSAIDs.  12/7/23 Patient complains of pain when walking, RLE pain and occasional numbness and tingling in the right foot. Patient states he is taking 2 aleve daily for pain.  1/18/24 Patient complains of continued pain. MRI L reviewed.   Office Visit 02/28/24 patient reporting RLE pain with numbness and tingling to the right foot. Patient has an appointment to discuss surgery.   Surgery 03/18/2024 L4-L5 hemilaminotomy and microdiscectomy  Office Visit 03/27/2024 patient reporting significant improvement in RLE pain. He states a few days ago he had an episode of right lower extremity pain. Incision appears to be healing well with no signs of infection such as swelling, drainage, redness. He denies any SOB, chest pain, unilateral leg swelling.   Office Visit 04/24/2024 patient reporting significant improvement in RLE pain. Incision healing well with no swelling, drainage, or redness noted. Patient is cleared to start PT.   Today 06/06/2024 patient doing well. Denies any lower back pain or leg pain.

## 2024-09-11 NOTE — HISTORY OF PRESENT ILLNESS
[FreeTextEntry1] : 42 yo M with severe back pain to RLE for a year without injury. Images showed L4-5 large disc herniation. He underwent elective L4-5 microdiskectomy on 10/23/23. Post op hospital stay was uneventful and he was discharged to home.  11/7/23 he reports pain has been slowly improving and noticed intermittent leg shooting pain from changing position otherwise denies fever/chills, cp, sob, dizziness, n/v, BB dysfunction, redness/swelling/discharge from surgical incision. Pain has been controlled with NSAIDs.  12/7/23 Patient complains of pain when walking, RLE pain and occasional numbness and tingling in the right foot. Patient states he is taking 2 aleve daily for pain.  1/18/24 Patient complains of continued pain. MRI L reviewed.   Office Visit 02/28/24 patient reporting RLE pain with numbness and tingling to the right foot. Patient has an appointment to discuss surgery.   Surgery 03/18/2024 L4-L5 hemilaminotomy and microdiscectomy  Office Visit 03/27/2024 patient reporting significant improvement in RLE pain. He states a few days ago he had an episode of right lower extremity pain. Incision appears to be healing well with no signs of infection such as swelling, drainage, redness. He denies any SOB, chest pain, unilateral leg swelling.   Office Visit 04/24/2024 patient reporting significant improvement in RLE pain. Incision healing well with no swelling, drainage, or redness noted. Patient is cleared to start PT.   Office Visit 06/06/2024 patient doing well. Denies any lower back pain or leg pain.   Today 09/19/2024

## 2024-09-11 NOTE — REASON FOR VISIT
[Follow-Up: _____] : a [unfilled] follow-up visit [de-identified] : L4-L5 hemilaminotomy and microdiscectomy [de-identified] : 03/18/2024

## 2024-09-19 ENCOUNTER — APPOINTMENT (OUTPATIENT)
Dept: SPINE | Facility: CLINIC | Age: 44
End: 2024-09-19
Payer: COMMERCIAL

## 2024-09-19 ENCOUNTER — NON-APPOINTMENT (OUTPATIENT)
Age: 44
End: 2024-09-19

## 2024-09-19 VITALS
RESPIRATION RATE: 18 BRPM | WEIGHT: 172 LBS | TEMPERATURE: 97.6 F | BODY MASS INDEX: 26.07 KG/M2 | SYSTOLIC BLOOD PRESSURE: 118 MMHG | HEART RATE: 73 BPM | HEIGHT: 68 IN | DIASTOLIC BLOOD PRESSURE: 75 MMHG | OXYGEN SATURATION: 98 %

## 2024-09-19 DIAGNOSIS — Z98.890 OTHER SPECIFIED POSTPROCEDURAL STATES: ICD-10-CM

## 2024-09-19 PROCEDURE — 99213 OFFICE O/P EST LOW 20 MIN: CPT

## 2024-09-20 NOTE — ADDENDUM
[FreeTextEntry1] : The patient is a 44-year-old gentleman who is 6 months status post lumbar microdiscectomy revision.  Overall he is doing very well.  He has intermittent low back pain with no radicular symptoms.  His low back pain has been self-limited.  Overall he was very happy with his progress.  The patient has finished with his physical therapy.  The patient should follow-up in 6 months as per routine.  He knows to call the office if he should have any new questions, concerns or complaints.

## 2024-09-20 NOTE — HISTORY OF PRESENT ILLNESS
[FreeTextEntry1] : 42 yo M with severe back pain to RLE for a year without injury. Images showed L4-5 large disc herniation. He underwent elective L4-5 microdiskectomy on 10/23/23. Post op hospital stay was uneventful and he was discharged to home.  11/7/23 he reports pain has been slowly improving and noticed intermittent leg shooting pain from changing position otherwise denies fever/chills, cp, sob, dizziness, n/v, BB dysfunction, redness/swelling/discharge from surgical incision. Pain has been controlled with NSAIDs.  12/7/23 Patient complains of pain when walking, RLE pain and occasional numbness and tingling in the right foot. Patient states he is taking 2 aleve daily for pain.  1/18/24 Patient complains of continued pain. MRI L reviewed.   Office Visit 02/28/24 patient reporting RLE pain with numbness and tingling to the right foot. Patient has an appointment to discuss surgery.   Surgery 03/18/2024 L4-L5 hemilaminotomy and microdiscectomy  Office Visit 03/27/2024 patient reporting significant improvement in RLE pain. He states a few days ago he had an episode of right lower extremity pain. Incision appears to be healing well with no signs of infection such as swelling, drainage, redness. He denies any SOB, chest pain, unilateral leg swelling.   Office Visit 04/24/2024 patient reporting significant improvement in RLE pain. Incision healing well with no swelling, drainage, or redness noted. Patient is cleared to start PT.   Office Visit 06/06/2024 patient doing well. Denies any lower back pain or leg pain.   Today 09/19/2024 patient reporting some intermittent lower back pain radiating to lower extremities which self resolves.

## 2024-09-20 NOTE — HISTORY OF PRESENT ILLNESS
[FreeTextEntry1] : 44 yo M with severe back pain to RLE for a year without injury. Images showed L4-5 large disc herniation. He underwent elective L4-5 microdiskectomy on 10/23/23. Post op hospital stay was uneventful and he was discharged to home.  11/7/23 he reports pain has been slowly improving and noticed intermittent leg shooting pain from changing position otherwise denies fever/chills, cp, sob, dizziness, n/v, BB dysfunction, redness/swelling/discharge from surgical incision. Pain has been controlled with NSAIDs.  12/7/23 Patient complains of pain when walking, RLE pain and occasional numbness and tingling in the right foot. Patient states he is taking 2 aleve daily for pain.  1/18/24 Patient complains of continued pain. MRI L reviewed.   Office Visit 02/28/24 patient reporting RLE pain with numbness and tingling to the right foot. Patient has an appointment to discuss surgery.   Surgery 03/18/2024 L4-L5 hemilaminotomy and microdiscectomy  Office Visit 03/27/2024 patient reporting significant improvement in RLE pain. He states a few days ago he had an episode of right lower extremity pain. Incision appears to be healing well with no signs of infection such as swelling, drainage, redness. He denies any SOB, chest pain, unilateral leg swelling.   Office Visit 04/24/2024 patient reporting significant improvement in RLE pain. Incision healing well with no swelling, drainage, or redness noted. Patient is cleared to start PT.   Office Visit 06/06/2024 patient doing well. Denies any lower back pain or leg pain.   Today 09/19/2024 patient reporting some intermittent lower back pain radiating to lower extremities which self resolves.

## 2025-03-25 NOTE — DISCHARGE NOTE PROVIDER - HOSPITAL COURSE
PA approval received:    We’re pleased to let you know that we’ve approved your or your doctor’s request for coverage for Testosterone Cypionate 200MG/ML IM SOLN. You can now fill your prescription, and it will be covered according to your plan.     As long as you remain covered by your prescription drug plan and there are no changes to your plan benefits, this request is approved from 03/25/2025 to 03/25/2026. When this approval expires, please speak to your doctor about your treatment.    this 43 year old male patient presented with c/o right lumbar radiculopathy due to right L4-L5 prolapsed intervertebral disc. On 10/23/2023 he underwent endoscopic right L4-L5 transforaminal discectomy. His post-operative recovery was uneventful and now he is being discharged home after observation.

## 2025-06-19 NOTE — PRE-OP CHECKLIST - SKIN PREP
06/19/25 1049   Right Leg Edema Point of Measurement   Leg circumference 31 cm   Ankle circumference 18 cm   Foot circumference 21 cm   Compression Therapy 2 layer compression wrap   Left Leg Edema Point of Measurement   Leg circumference 34 cm   Ankle circumference 20 cm   Foot circumference 21 cm   Compression Therapy 2 layer compression wrap   Wound 04/18/25 Toe (Comment  which one) Left #5 5th toe   Date First Assessed/Time First Assessed: 04/18/25 1119   Present on Original Admission: No  Wound Approximate Age at First Assessment (Weeks): 1 weeks  Location: (c) Toe (Comment  which one)  Wound Location Orientation: Left  Wound Description (Commen...   Wound Image    Wound Etiology Pressure Stage 4   Dressing Status Intact   Wound Cleansed Vashe   Dressing/Treatment Xeroform   Wound Length (cm) 0 cm   Wound Width (cm) 0 cm   Wound Depth (cm) 0 cm   Wound Surface Area (cm^2) 0 cm^2   Change in Wound Size % (l*w) 100   Wound Volume (cm^3) 0 cm^3   Wound Healing % 100   Wound Assessment Epithelialization   Drainage Amount None (dry)   Odor None   Yolanda-wound Assessment Fragile   Pain Assessment   Pain Assessment None - Denies Pain        done

## (undated) DEVICE — SPONGE SURGICAL STRIP 1/4 X 6"

## (undated) DEVICE — HANDLE TRIGGER FLEX 31MM

## (undated) DEVICE — DRAPE LIGHT HANDLE COVER (GREEN)

## (undated) DEVICE — PREP CHLORAPREP HI-LITE ORANGE 26ML

## (undated) DEVICE — DRAPE 3/4 SHEET 52X76"

## (undated) DEVICE — DRAPE TOP SHEET 53" X 101"

## (undated) DEVICE — NDL HYPO SAFE 22G X 1.5" (BLACK)

## (undated) DEVICE — GLV 8 PROTEXIS (WHITE)

## (undated) DEVICE — BUR NEW YORK SPINE DIAMOND 3.7MM

## (undated) DEVICE — DRAPE C ARM C-ARMOUR

## (undated) DEVICE — DRAPE 1/2 SHEET 40X57"

## (undated) DEVICE — SUT VICRYL 0 27" UR-6

## (undated) DEVICE — ELCTR STRYKER NEPTUNE SMOKE EVACUATION PENCIL (GREEN)

## (undated) DEVICE — DRSG TELFA 3 X 8

## (undated) DEVICE — DRSG DERMABOND 0.7ML

## (undated) DEVICE — NDL SPINE 16FR 8"

## (undated) DEVICE — TUBE LITE RELIANCE

## (undated) DEVICE — DRSG BENZOIN 0.6CC

## (undated) DEVICE — PACK SPINE

## (undated) DEVICE — DRAPE SHOWER CURTAIN ISOLATION

## (undated) DEVICE — DISK TRAY ENDOSCOPIC

## (undated) DEVICE — INST MICRO 2.0

## (undated) DEVICE — NDL SET 250MM

## (undated) DEVICE — GLV 8 PROTEXIS ORTHO (CREAM)

## (undated) DEVICE — DRAPE IOBAN 33" X 23"

## (undated) DEVICE — SUT VICRYL PLUS 2-0 18" CT-2 (POP-OFF)

## (undated) DEVICE — SUT MONOCRYL 4-0 27" PS-2 UNDYED

## (undated) DEVICE — DRAPE U 47X51" LF STERILE

## (undated) DEVICE — STAPLER SKIN PROXIMATE

## (undated) DEVICE — GLV 7.5 PROTEXIS (WHITE)

## (undated) DEVICE — NDL SPINAL 18G X 3.5" (PINK)

## (undated) DEVICE — DRAPE INSTRUMENT POUCH 6.75" X 11"

## (undated) DEVICE — PREP SCRUB BRUSH W CHG 4%

## (undated) DEVICE — MIDAS REX MR8 MATCH HEAD FLUTED SM BORE 3MM X 10CM

## (undated) DEVICE — DRAPE MICROSCOPE LEICA 26" X 150"

## (undated) DEVICE — DRSG TEGADERM 4X4.75"

## (undated) DEVICE — INSTRUMENT 2 LITE

## (undated) DEVICE — SOL IRR BAG NS 0.9% 3000ML

## (undated) DEVICE — SPONGE SURGICAL STRIP 1/2 X 6"

## (undated) DEVICE — GLV 8.5 PROTEXIS ORTHO (CREAM)

## (undated) DEVICE — SUT MONOCRYL 3-0 18" PS-2 UNDYED

## (undated) DEVICE — SUPPORT TUBE 320MM